# Patient Record
Sex: FEMALE | URBAN - NONMETROPOLITAN AREA
[De-identification: names, ages, dates, MRNs, and addresses within clinical notes are randomized per-mention and may not be internally consistent; named-entity substitution may affect disease eponyms.]

---

## 2021-05-10 ENCOUNTER — APPOINTMENT (RX ONLY)
Dept: URBAN - NONMETROPOLITAN AREA CLINIC 1 | Facility: CLINIC | Age: 56
Setting detail: DERMATOLOGY
End: 2021-05-10

## 2022-05-05 ENCOUNTER — APPOINTMENT (RX ONLY)
Dept: URBAN - NONMETROPOLITAN AREA CLINIC 1 | Facility: CLINIC | Age: 57
Setting detail: DERMATOLOGY
End: 2022-05-05

## 2022-05-05 DIAGNOSIS — Z41.9 ENCOUNTER FOR PROCEDURE FOR PURPOSES OTHER THAN REMEDYING HEALTH STATE, UNSPECIFIED: ICD-10-CM

## 2022-05-05 PROCEDURE — ? MICRODERMABRASION/FACIAL

## 2022-05-05 PROCEDURE — ? COSMETIC CONSULTATION - MICRO-NEEDLING

## 2022-05-05 ASSESSMENT — LOCATION DETAILED DESCRIPTION DERM: LOCATION DETAILED: INFERIOR MID FOREHEAD

## 2022-05-05 ASSESSMENT — LOCATION ZONE DERM: LOCATION ZONE: FACE

## 2022-05-05 ASSESSMENT — LOCATION SIMPLE DESCRIPTION DERM: LOCATION SIMPLE: INFERIOR FOREHEAD

## 2022-05-05 NOTE — PROCEDURE: MICRODERMABRASION/FACIAL
Consent reviewed verbally by RN and signed by pt.
Number Of Passes: 2
Extraction Method: extractor
Comments: Pt swished with antiseptic mouthwash for 30 seconds prior to treatment.
Vacuum Pressure: 25
Serum Type (Optional): HABS
Endpoint: mild erythema
Indication: skin rejuvenation
Treatment Number: 1
Moisturizer Type (Optional): alastin ultra nourishing
Prep Text: The pt's skin was cleansed and dried prior to microdermabrasion.
Facial Steaming: steamed
Spf (Optional): PCA sheer tint
Vacuum Pressure Units: inches Hg
Prefacial Cleansing: Alastin gentle cleanse, microdermabrasion, papaya enzyme, extractions, hydrate masque and previously stated serums, moisturizer, spf.
Detail Level: Zone
Post-Care Instructions: Pt may resume normal home skincare routine. Sun protection emphasized. All pt concerns and questions addressed and pt verbalized understanding.
Price (Use Numbers Only, No Special Characters Or $): 160

## 2024-12-18 ENCOUNTER — APPOINTMENT (OUTPATIENT)
Dept: URBAN - NONMETROPOLITAN AREA CLINIC 1 | Facility: CLINIC | Age: 59
Setting detail: DERMATOLOGY
End: 2024-12-18

## 2024-12-18 DIAGNOSIS — Z71.89 OTHER SPECIFIED COUNSELING: ICD-10-CM

## 2024-12-18 DIAGNOSIS — L82.1 OTHER SEBORRHEIC KERATOSIS: ICD-10-CM

## 2024-12-18 PROCEDURE — 99213 OFFICE O/P EST LOW 20 MIN: CPT

## 2024-12-18 PROCEDURE — ? COUNSELING

## 2024-12-18 PROCEDURE — ? SUNSCREEN RECOMMENDATIONS
